# Patient Record
Sex: MALE | Race: ASIAN | NOT HISPANIC OR LATINO | URBAN - METROPOLITAN AREA
[De-identification: names, ages, dates, MRNs, and addresses within clinical notes are randomized per-mention and may not be internally consistent; named-entity substitution may affect disease eponyms.]

---

## 2023-06-21 VITALS
HEART RATE: 94 BPM | OXYGEN SATURATION: 97 % | SYSTOLIC BLOOD PRESSURE: 141 MMHG | WEIGHT: 171.96 LBS | TEMPERATURE: 98 F | RESPIRATION RATE: 16 BRPM | DIASTOLIC BLOOD PRESSURE: 82 MMHG | HEIGHT: 68 IN

## 2023-06-21 RX ORDER — CHLORHEXIDINE GLUCONATE 213 G/1000ML
1 SOLUTION TOPICAL ONCE
Refills: 0 | Status: DISCONTINUED | OUTPATIENT
Start: 2023-06-28 | End: 2023-07-12

## 2023-06-21 NOTE — H&P ADULT - ASSESSMENT
58 year old male with PMHX of HTN, HLD,  DM2 who presented to their outpatient cardiologist Dr. Solis complaining of CP intermittent mild substernal chest pressure, non radiating, occurring w/ mod physical exertion, lasting a few minutes and improves w/ rest, that has been progressively worsening over the past few weeks and SALINAS with minimal exertion only able to walk to 2-3 block.  NST 5/28/2023 small perfusion abnormality of mild intensity in the inferior and anterior region on stress images and mild reduced ef 55% on stress. In light of pts risk factors, CCS class III anginal symptoms and abnormal NST, pt now presents to [facility] for recommended cardiac catheterization with possible intervention if clinically indicated.      -H/H = ____. Pt denies BRBPR, hematuria, hematochezia, melena. Pt loaded 325mg ASA x1 and Plavix 600mg x1  -Cr = _____. EF normal. Euvolemic on exam. IVF with 250cc bolus x1 followed by NS @ 75cc/hr started pre procedure per protocol  -Type of sedation: moderate  -Candidate for sedation: yes    Risks & benefits of procedure and alternative therapy have been explained to the patient including but not limited to: allergic reaction, bleeding w/possible need for blood transfusion, infection, renal and vascular compromise, limb damage, arrhythmia, stroke, vessel dissection/perforation, Myocardial infarction, emergent CABG. Informed consent obtained and in chart.  58 year old male with PMHX of HTN, HLD,  DM2 who presented to their outpatient cardiologist Dr. Solis complaining of CP intermittent mild substernal chest pressure, non radiating, occurring w/ mod physical exertion, lasting a few minutes and improves w/ rest, that has been progressively worsening over the past few weeks and SALINAS with minimal exertion only able to walk to 2-3 block.  NST 5/28/2023 small perfusion abnormality of mild intensity in the inferior and anterior region on stress images and mild reduced ef 55% on stress. In light of pts risk factors, CCS class III anginal symptoms and abnormal NST, pt now presents to [facility] for recommended cardiac catheterization with possible intervention if clinically indicated.      -H/H (from outpatient labs) 14.4/42.9. Pt denies BRBPR, hematuria, hematochezia, melena. Pt loaded 325mg ASA x1 and Plavix 600mg x1  -Cr = 1.07. EF normal. Euvolemic on exam. IVF with 250cc bolus x1 followed by NS @ 75cc/hr started pre procedure per protocol  -Type of sedation: moderate  -Candidate for sedation: yes    Risks & benefits of procedure and alternative therapy have been explained to the patient including but not limited to: allergic reaction, bleeding w/possible need for blood transfusion, infection, renal and vascular compromise, limb damage, arrhythmia, stroke, vessel dissection/perforation, Myocardial infarction, emergent CABG. Informed consent obtained and in chart.

## 2023-06-21 NOTE — H&P ADULT - HISTORY OF PRESENT ILLNESS
Cardio:Dr. Solis    Pharmacy:  Escort:    58 year old male with PMHX of HTN, HLD,  DM2 who presented to their outpatient cardiologist  complaining of CP intermittent mild substernal chest pressure, non radiating, occurring w/ mod physical exertion, lasting a few minutes and improves w/ rest, that has been progressively worsening over the past few weeks and SALINAS with minimal exertion only able to walk to 2-3 block.  Patient underwent ECHO 4/30/2023 LVEF 60% Grade 1 DD, mild Valvular disease. Patient underwent NST 5/28/2023 small perfusion abnormality of mild intensity in the inferior and anterior region on stress images and mild reduced ef 55% on stress. Pt denies CP/SOB, dizziness, palpitations, orthopnea/PND, leg swelling, LOC, bleeding, melena/hematochezia, fever, chills, URI symptoms, or recent illness.  In light of pts risk factors, CCS class _ anginal symptoms and abnormal NST, pt now presents to [facility] for recommended cardiac catheterization with possible intervention if clinically indicated.     Cardiologist: Dr. Solis    Pharmacy: [facility]  Escort: Son    58 year old male with PMHX of HTN, HLD,  DM2 who presented to their outpatient cardiologist Dr. Solis complaining of CP intermittent mild substernal chest pressure, non radiating, occurring w/ mod physical exertion, lasting a few minutes and improves w/ rest, that has been progressively worsening over the past few weeks and SALINAS with minimal exertion only able to walk to 2-3 block.  Patient underwent ECHO 4/30/2023 LVEF 60% Grade 1 DD, mild Valvular disease. Patient underwent NST 5/28/2023 small perfusion abnormality of mild intensity in the inferior and anterior region on stress images and mild reduced ef 55% on stress. Pt denies CP/SOB, dizziness, palpitations, orthopnea/PND, leg swelling, LOC, bleeding, melena/hematochezia, fever, chills, URI symptoms, or recent illness.  In light of pts risk factors, CCS class III anginal symptoms and abnormal NST, pt now presents to [facility] for recommended cardiac catheterization with possible intervention if clinically indicated.

## 2023-06-28 ENCOUNTER — OUTPATIENT (OUTPATIENT)
Dept: OUTPATIENT SERVICES | Facility: HOSPITAL | Age: 58
LOS: 1 days | Discharge: HOME | End: 2023-06-28
Payer: COMMERCIAL

## 2023-06-28 LAB
A1C WITH ESTIMATED AVERAGE GLUCOSE RESULT: 6.1 % — HIGH (ref 4–5.6)
ALBUMIN SERPL ELPH-MCNC: 4.6 G/DL — SIGNIFICANT CHANGE UP (ref 3.3–5)
ALP SERPL-CCNC: 82 U/L — SIGNIFICANT CHANGE UP (ref 40–120)
ALT FLD-CCNC: 74 U/L — HIGH (ref 10–45)
ANION GAP SERPL CALC-SCNC: 11 MMOL/L — SIGNIFICANT CHANGE UP (ref 5–17)
APTT BLD: 37.4 SEC — HIGH (ref 27.5–35.5)
AST SERPL-CCNC: 45 U/L — HIGH (ref 10–40)
BASOPHILS # BLD AUTO: 0.04 K/UL — SIGNIFICANT CHANGE UP (ref 0–0.2)
BASOPHILS NFR BLD AUTO: 0.5 % — SIGNIFICANT CHANGE UP (ref 0–2)
BILIRUB SERPL-MCNC: 0.4 MG/DL — SIGNIFICANT CHANGE UP (ref 0.2–1.2)
BUN SERPL-MCNC: 22 MG/DL — SIGNIFICANT CHANGE UP (ref 7–23)
CALCIUM SERPL-MCNC: 8.9 MG/DL — SIGNIFICANT CHANGE UP (ref 8.4–10.5)
CHLORIDE SERPL-SCNC: 101 MMOL/L — SIGNIFICANT CHANGE UP (ref 96–108)
CHOLEST SERPL-MCNC: 135 MG/DL — SIGNIFICANT CHANGE UP
CK MB CFR SERPL CALC: 2.2 NG/ML — SIGNIFICANT CHANGE UP (ref 0–6.7)
CK SERPL-CCNC: 204 U/L — HIGH (ref 30–200)
CO2 SERPL-SCNC: 28 MMOL/L — SIGNIFICANT CHANGE UP (ref 22–31)
CREAT SERPL-MCNC: 1.07 MG/DL — SIGNIFICANT CHANGE UP (ref 0.5–1.3)
EGFR: 79 ML/MIN/1.73M2 — SIGNIFICANT CHANGE UP
EOSINOPHIL # BLD AUTO: 0.09 K/UL — SIGNIFICANT CHANGE UP (ref 0–0.5)
EOSINOPHIL NFR BLD AUTO: 1 % — SIGNIFICANT CHANGE UP (ref 0–6)
ESTIMATED AVERAGE GLUCOSE: 128 MG/DL — HIGH (ref 68–114)
GLUCOSE SERPL-MCNC: 110 MG/DL — HIGH (ref 70–99)
HCT VFR BLD CALC: 44.6 % — SIGNIFICANT CHANGE UP (ref 39–50)
HDLC SERPL-MCNC: 42 MG/DL — SIGNIFICANT CHANGE UP
HGB BLD-MCNC: 15.5 G/DL — SIGNIFICANT CHANGE UP (ref 13–17)
IMM GRANULOCYTES NFR BLD AUTO: 0.2 % — SIGNIFICANT CHANGE UP (ref 0–0.9)
INR BLD: 0.98 — SIGNIFICANT CHANGE UP (ref 0.88–1.16)
LIPID PNL WITH DIRECT LDL SERPL: 71 MG/DL — SIGNIFICANT CHANGE UP
LYMPHOCYTES # BLD AUTO: 2.68 K/UL — SIGNIFICANT CHANGE UP (ref 1–3.3)
LYMPHOCYTES # BLD AUTO: 30.9 % — SIGNIFICANT CHANGE UP (ref 13–44)
MAGNESIUM SERPL-MCNC: 2.4 MG/DL — SIGNIFICANT CHANGE UP (ref 1.6–2.6)
MCHC RBC-ENTMCNC: 29.7 PG — SIGNIFICANT CHANGE UP (ref 27–34)
MCHC RBC-ENTMCNC: 34.8 GM/DL — SIGNIFICANT CHANGE UP (ref 32–36)
MCV RBC AUTO: 85.4 FL — SIGNIFICANT CHANGE UP (ref 80–100)
MONOCYTES # BLD AUTO: 0.6 K/UL — SIGNIFICANT CHANGE UP (ref 0–0.9)
MONOCYTES NFR BLD AUTO: 6.9 % — SIGNIFICANT CHANGE UP (ref 2–14)
NEUTROPHILS # BLD AUTO: 5.25 K/UL — SIGNIFICANT CHANGE UP (ref 1.8–7.4)
NEUTROPHILS NFR BLD AUTO: 60.5 % — SIGNIFICANT CHANGE UP (ref 43–77)
NON HDL CHOLESTEROL: 93 MG/DL — SIGNIFICANT CHANGE UP
NRBC # BLD: 0 /100 WBCS — SIGNIFICANT CHANGE UP (ref 0–0)
PLATELET # BLD AUTO: 238 K/UL — SIGNIFICANT CHANGE UP (ref 150–400)
POTASSIUM SERPL-MCNC: 4 MMOL/L — SIGNIFICANT CHANGE UP (ref 3.5–5.3)
POTASSIUM SERPL-SCNC: 4 MMOL/L — SIGNIFICANT CHANGE UP (ref 3.5–5.3)
PROT SERPL-MCNC: 8 G/DL — SIGNIFICANT CHANGE UP (ref 6–8.3)
PROTHROM AB SERPL-ACNC: 11.6 SEC — SIGNIFICANT CHANGE UP (ref 10.5–13.4)
RBC # BLD: 5.22 M/UL — SIGNIFICANT CHANGE UP (ref 4.2–5.8)
RBC # FLD: 12.3 % — SIGNIFICANT CHANGE UP (ref 10.3–14.5)
SODIUM SERPL-SCNC: 140 MMOL/L — SIGNIFICANT CHANGE UP (ref 135–145)
TRIGL SERPL-MCNC: 112 MG/DL — SIGNIFICANT CHANGE UP
WBC # BLD: 8.68 K/UL — SIGNIFICANT CHANGE UP (ref 3.8–10.5)
WBC # FLD AUTO: 8.68 K/UL — SIGNIFICANT CHANGE UP (ref 3.8–10.5)

## 2023-06-28 PROCEDURE — C1769: CPT

## 2023-06-28 PROCEDURE — 36415 COLL VENOUS BLD VENIPUNCTURE: CPT

## 2023-06-28 PROCEDURE — 80061 LIPID PANEL: CPT

## 2023-06-28 PROCEDURE — C1887: CPT

## 2023-06-28 PROCEDURE — 99152 MOD SED SAME PHYS/QHP 5/>YRS: CPT

## 2023-06-28 PROCEDURE — 93458 L HRT ARTERY/VENTRICLE ANGIO: CPT | Mod: 26

## 2023-06-28 PROCEDURE — 85025 COMPLETE CBC W/AUTO DIFF WBC: CPT

## 2023-06-28 PROCEDURE — 83036 HEMOGLOBIN GLYCOSYLATED A1C: CPT

## 2023-06-28 PROCEDURE — 85730 THROMBOPLASTIN TIME PARTIAL: CPT

## 2023-06-28 PROCEDURE — 82553 CREATINE MB FRACTION: CPT

## 2023-06-28 PROCEDURE — 83735 ASSAY OF MAGNESIUM: CPT

## 2023-06-28 PROCEDURE — 80053 COMPREHEN METABOLIC PANEL: CPT

## 2023-06-28 PROCEDURE — 93458 L HRT ARTERY/VENTRICLE ANGIO: CPT

## 2023-06-28 PROCEDURE — C1894: CPT

## 2023-06-28 PROCEDURE — 85610 PROTHROMBIN TIME: CPT

## 2023-06-28 PROCEDURE — 82550 ASSAY OF CK (CPK): CPT

## 2023-06-28 RX ORDER — EZETIMIBE 10 MG/1
1 TABLET ORAL
Refills: 0 | DISCHARGE

## 2023-06-28 RX ORDER — ASPIRIN/CALCIUM CARB/MAGNESIUM 324 MG
325 TABLET ORAL ONCE
Refills: 0 | Status: COMPLETED | OUTPATIENT
Start: 2023-06-28 | End: 2023-06-28

## 2023-06-28 RX ORDER — ATORVASTATIN CALCIUM 80 MG/1
1 TABLET, FILM COATED ORAL
Refills: 0 | DISCHARGE

## 2023-06-28 RX ORDER — CLOPIDOGREL BISULFATE 75 MG/1
600 TABLET, FILM COATED ORAL ONCE
Refills: 0 | Status: COMPLETED | OUTPATIENT
Start: 2023-06-28 | End: 2023-06-28

## 2023-06-28 RX ORDER — SODIUM CHLORIDE 9 MG/ML
500 INJECTION INTRAMUSCULAR; INTRAVENOUS; SUBCUTANEOUS
Refills: 0 | Status: DISCONTINUED | OUTPATIENT
Start: 2023-06-28 | End: 2023-06-28

## 2023-06-28 RX ORDER — SODIUM CHLORIDE 9 MG/ML
250 INJECTION INTRAMUSCULAR; INTRAVENOUS; SUBCUTANEOUS ONCE
Refills: 0 | Status: COMPLETED | OUTPATIENT
Start: 2023-06-28 | End: 2023-06-28

## 2023-06-28 RX ORDER — METOPROLOL TARTRATE 50 MG
1 TABLET ORAL
Refills: 0 | DISCHARGE

## 2023-06-28 RX ORDER — RANOLAZINE 500 MG/1
500 TABLET, FILM COATED, EXTENDED RELEASE ORAL
Refills: 0 | DISCHARGE

## 2023-06-28 RX ORDER — LOSARTAN/HYDROCHLOROTHIAZIDE 100MG-25MG
1 TABLET ORAL
Refills: 0 | DISCHARGE

## 2023-06-28 RX ORDER — SODIUM CHLORIDE 9 MG/ML
500 INJECTION INTRAMUSCULAR; INTRAVENOUS; SUBCUTANEOUS
Refills: 0 | Status: DISCONTINUED | OUTPATIENT
Start: 2023-06-28 | End: 2023-07-12

## 2023-06-28 RX ADMIN — SODIUM CHLORIDE 75 MILLILITER(S): 9 INJECTION INTRAMUSCULAR; INTRAVENOUS; SUBCUTANEOUS at 09:17

## 2023-06-28 RX ADMIN — CLOPIDOGREL BISULFATE 600 MILLIGRAM(S): 75 TABLET, FILM COATED ORAL at 09:33

## 2023-06-28 RX ADMIN — SODIUM CHLORIDE 240 MILLILITER(S): 9 INJECTION INTRAMUSCULAR; INTRAVENOUS; SUBCUTANEOUS at 10:55

## 2023-06-28 RX ADMIN — Medication 325 MILLIGRAM(S): at 09:33

## 2023-06-28 RX ADMIN — SODIUM CHLORIDE 500 MILLILITER(S): 9 INJECTION INTRAMUSCULAR; INTRAVENOUS; SUBCUTANEOUS at 09:17

## 2023-06-28 NOTE — PROGRESS NOTE ADULT - SUBJECTIVE AND OBJECTIVE BOX
Interventional Cardiology PA SDA Discharge Note    Patient without complaints. Ambulated and voided without difficulties    Afebrile, VSS    Ext: Right Radial : No hematoma, no  bleeding, dressing; C/D/I    Pulses:    intact RAD to baseline     A/P:  58 year old male with PMHX of HTN, HLD,  DM2 who presented to their outpatient cardiologist Dr. Solis complaining of CP intermittent mild substernal chest pressure, non radiating, occurring w/ mod physical exertion, lasting a few minutes and improves w/ rest, that has been progressively worsening over the past few weeks and SALINAS with minimal exertion only able to walk to 2-3 block.  Patient underwent ECHO 4/30/2023 LVEF 60% Grade 1 DD, mild Valvular disease. Patient underwent NST 5/28/2023 small perfusion abnormality of mild intensity in the inferior and anterior region on stress images and mild reduced ef 55% on stress. Pt denies CP/SOB, dizziness, palpitations, orthopnea/PND, leg swelling, LOC, bleeding, melena/hematochezia, fever, chills, URI symptoms, or recent illness.  In light of pts risk factors, CCS class III anginal symptoms and abnormal NST, pt now presents to [facility] for recommended cardiac catheterization with possible intervention if clinically indicated.      Patient is s/p cardiac cath 6/28/23: RCA: mild ectasia proximally with MLI, LM: mild luminal irregularities, OM1: mild luminal irregularities, mLAD: 20% stenosis, otherwise MLI; EDP: 4, EF: 60% as per echo.     1.	Stable for discharge as per attending Dr Solis, after  bed rest, pt voids, wrist stable and 30 minutes of ambulation.  2.	Follow-up with Cardiologist, Dr. Solis, in 1-2 weeks  3.	Discharged forms signed and copies in chart

## 2023-06-30 DIAGNOSIS — I25.110 ATHEROSCLEROTIC HEART DISEASE OF NATIVE CORONARY ARTERY WITH UNSTABLE ANGINA PECTORIS: ICD-10-CM

## 2023-06-30 DIAGNOSIS — R94.39 ABNORMAL RESULT OF OTHER CARDIOVASCULAR FUNCTION STUDY: ICD-10-CM
